# Patient Record
Sex: FEMALE | Race: WHITE | NOT HISPANIC OR LATINO | URBAN - METROPOLITAN AREA
[De-identification: names, ages, dates, MRNs, and addresses within clinical notes are randomized per-mention and may not be internally consistent; named-entity substitution may affect disease eponyms.]

---

## 2020-11-13 NOTE — PATIENT DISCUSSION
for one week, IOP was elevated now  controlled, no associated systemic complaints, but patient did have illness 2 months ago with diffuse body pain, moderate vitritis present with AC cell,  Will check labs today, if no evidence of infection will do STK injection and start PO steroids.   continue pred forte 1% Q2H, ilevro QDAY, Atropine BID.

## 2020-11-16 NOTE — PATIENT DISCUSSION
11/16/20: FIRST EPISODE. SEVERE. NO EVIDENCE OF GRANULOMATOUS INF ANTERIORLY, BUT SNOWBALLS ARE SIGNIFICANT IN ANT AND POST VITREOUS. GIVEN SECONDARY GLAUCOMA MAIN DDX: FUCHS (UNLIKELY 2/2 CHANGES POSTERIORLY, SEVERITY). MOST LIKELY SARCOID VS HERPETIC. THE LATTER IS MOST CONCERNING TO ME GIVEN PERIPHERAL WHITENING APPRECIATED ON EXAM TODAY. DISCUSSED R/B OF TX IF ARN. THEREFORE, WE'LL START PATIENT ON VALTREX 1 G TID AS INDUCTION. MAY START STEROIDS PO ON WEDNESDAY.

## 2020-11-18 NOTE — PATIENT DISCUSSION
IOP improved, decreased inflammation,   continue pred forte 1% Q2H, ilevro QDAY, Atropine BID, labs pending, ac biopsy pending. Continue acyclovir but ARN unlikely.

## 2020-11-25 NOTE — PATIENT DISCUSSION
11/25/20: STUDIES HAVE ONLY COME BACK + FOR LYME -SPECIFIC SUBSTUDIES- W/O RELATIVE IMPORTANCE. RPR, QF, HERPES & TOXO STUDIES NEGATIVE. AFTER R/O INFECTIOUS ETIOLOGIES, WILL START 1MG/KG PREDNISONE PO AND REASSESS RESPONSE. WILL CONT INVESTIGATING ETIOLOGY. WILL COMPLETE 10 D OF VALTREX TX TO MAKE SURE SHE IS COVERED SINCE CLINICALLY IT WAS VERY SUGGESTIVE OF HERPES.

## 2020-12-01 NOTE — PATIENT DISCUSSION
no improvement in posterior reaction, only studies that have came back positive are LYME, continue valtrex & PO prednisone due to concern for ARN; however, will add Bacterim DS, if no improvement, will plan for a diagnostic PPV.

## 2020-12-04 NOTE — PATIENT DISCUSSION
CULTURE 11 16 20 SHOWS NEGATIVE CMV TOXO HSV - UNABLE TO GET VARICELLA DUE TO MINIMAL AMOUNT OF SPECIMEN.

## 2020-12-11 NOTE — PATIENT DISCUSSION
Saw Dr. Canela Factor, most likely ARN, continue valtrex & PO prednisone& bactrim DS, if no improvement, will plan for a diagnostic PPV.

## 2020-12-18 NOTE — PATIENT DISCUSSION
improved, continue valtrex & Decrease prednisone to 40MG QDAY PO & bactrim DS, if no improvement, will plan for a diagnostic PPV.

## 2021-01-13 NOTE — PATIENT DISCUSSION
improved, most likely ARN, continue valtrex TID  & D/C bactrim DS, Taper prednisone to 20MG a day for 2W then stop, taper pred forte TID for 2W, BID for 2W, QDAY for 2W thens stop.

## 2021-02-03 NOTE — PATIENT DISCUSSION
Improved, off PO steroids,  continue Valtrex, Continue taper of Pred Forte  BID for 2W, QDAY for 2W then stop.

## 2021-02-24 NOTE — PATIENT DISCUSSION
Resolving,  off PO steroids,  continue Valtrex, Continue taper of Pred Forte  to QDAY for 2W then stop.

## 2022-01-01 NOTE — PATIENT DISCUSSION
Recommended observation. If your baby has any of the following, CALL YOUR PEDIATRICIAN OR RETURN TO THE HOSPITAL

## 2022-01-10 ENCOUNTER — COMPREHENSIVE EXAM (OUTPATIENT)
Dept: URBAN - METROPOLITAN AREA CLINIC 16 | Facility: CLINIC | Age: 57
End: 2022-01-10

## 2022-01-10 DIAGNOSIS — H52.12: ICD-10-CM

## 2022-01-10 DIAGNOSIS — H52.223: ICD-10-CM

## 2022-01-10 DIAGNOSIS — H52.11: ICD-10-CM

## 2022-01-10 DIAGNOSIS — Z01.00: ICD-10-CM

## 2022-01-10 DIAGNOSIS — H52.4: ICD-10-CM

## 2022-01-10 DIAGNOSIS — H04.123: ICD-10-CM

## 2022-01-10 PROCEDURE — 92310C CONTACT LENS 75

## 2022-01-10 PROCEDURE — 92015 DETERMINE REFRACTIVE STATE: CPT

## 2022-01-10 PROCEDURE — 92014 COMPRE OPH EXAM EST PT 1/>: CPT

## 2022-01-10 ASSESSMENT — TONOMETRY
OD_IOP_MMHG: 16
OS_IOP_MMHG: 15

## 2022-04-14 NOTE — PATIENT DISCUSSION
Patient made aware of 24/7 emergency services. Mohs Case Number (Optional): [id number] Mohs Case Number (Optional): [id number]

## 2022-05-04 NOTE — PROCEDURE NOTE: CLINICAL
PROCEDURE NOTE: Punctal Plugs, Quintess Dissolvable (61085M, [id number]) Bilateral Lower Lids. Diagnosis: Dry Eye Syndrome. Prior to treatment, the risks/benefits/alternatives were discussed. The patient wished to proceed with procedure. Temporary collagen plugs were inserted. Patient tolerated procedure well. There were no complications. Post procedure instructions given. Catrachito Ross

## 2022-06-15 NOTE — PATIENT DISCUSSION
Pt C/O Flashes and floaters in the left eye, Scleral depressed No RT or RD on Exam, continue to monitor.

## 2022-07-05 RX ORDER — LORAZEPAM 1 MG/1
TABLET ORAL
COMMUNITY
Start: 2022-03-08

## 2022-07-05 RX ORDER — MONTELUKAST SODIUM 10 MG/1
TABLET ORAL
COMMUNITY

## 2022-08-17 NOTE — PATIENT DISCUSSION
Advised to call immediately if eye pain or loss of vision. [facility]          Chart reviewed, no opportunity found: CHART REVIEWED, NO OPPORTUNITY FOUND                     Patients insurance company:  [employer] [facility] (Medicare Advantage and Commercial)

## 2022-11-23 NOTE — PROCEDURE NOTE: CLINICAL
PROCEDURE NOTE: Punctal Plugs, Silicone OASIS 0.7 / 0.7 #2 Bilateral Lower Lids. Diagnosis: Dry Eye Syndrome. Anesthesia: Proparacaine 0.5%. Prior to treatment, the risks/benefits/alternatives were discussed. The patient wished to proceed with procedure. 1 drop of Proparacaine 0.5% was instilled in eye. 4.4.8 mm permanent silicone plugs were inserted in * eyelids. Lot # * and exp *  Patient tolerated procedure well. There were no complications. Post procedure instructions given. Briseida Mercedes

## 2023-01-16 ENCOUNTER — COMPREHENSIVE EXAM (OUTPATIENT)
Dept: URBAN - METROPOLITAN AREA CLINIC 16 | Facility: CLINIC | Age: 58
End: 2023-01-16

## 2023-01-16 DIAGNOSIS — H52.11: ICD-10-CM

## 2023-01-16 DIAGNOSIS — H52.12: ICD-10-CM

## 2023-01-16 DIAGNOSIS — Z01.00: ICD-10-CM

## 2023-01-16 DIAGNOSIS — H04.123: ICD-10-CM

## 2023-01-16 DIAGNOSIS — H52.4: ICD-10-CM

## 2023-01-16 DIAGNOSIS — H52.223: ICD-10-CM

## 2023-01-16 PROCEDURE — 92014 COMPRE OPH EXAM EST PT 1/>: CPT

## 2023-01-16 PROCEDURE — 92310C CONTACT LENS 75

## 2023-01-16 PROCEDURE — 92015 DETERMINE REFRACTIVE STATE: CPT

## 2023-01-16 ASSESSMENT — TONOMETRY
OS_IOP_MMHG: 16
OD_IOP_MMHG: 16

## 2024-01-18 ENCOUNTER — COMPREHENSIVE EXAM (OUTPATIENT)
Dept: URBAN - METROPOLITAN AREA CLINIC 16 | Facility: CLINIC | Age: 59
End: 2024-01-18

## 2024-01-18 DIAGNOSIS — H52.223: ICD-10-CM

## 2024-01-18 DIAGNOSIS — H52.4: ICD-10-CM

## 2024-01-18 DIAGNOSIS — H04.123: ICD-10-CM

## 2024-01-18 DIAGNOSIS — H52.12: ICD-10-CM

## 2024-01-18 DIAGNOSIS — Z01.00: ICD-10-CM

## 2024-01-18 DIAGNOSIS — H52.11: ICD-10-CM

## 2024-01-18 PROCEDURE — 92014 COMPRE OPH EXAM EST PT 1/>: CPT

## 2024-01-18 PROCEDURE — 92015 DETERMINE REFRACTIVE STATE: CPT

## 2024-01-18 PROCEDURE — 92310C CONTACT LENS 75

## 2024-01-18 ASSESSMENT — TONOMETRY
OS_IOP_MMHG: 12
OD_IOP_MMHG: 12

## 2025-01-28 ENCOUNTER — COMPREHENSIVE EXAM (OUTPATIENT)
Age: 60
End: 2025-01-28

## 2025-01-28 DIAGNOSIS — H04.123: ICD-10-CM

## 2025-01-28 DIAGNOSIS — H52.12: ICD-10-CM

## 2025-01-28 DIAGNOSIS — H52.223: ICD-10-CM

## 2025-01-28 DIAGNOSIS — H52.11: ICD-10-CM

## 2025-01-28 DIAGNOSIS — H52.4: ICD-10-CM

## 2025-01-28 DIAGNOSIS — Z01.00: ICD-10-CM

## 2025-01-28 PROCEDURE — 92015 DETERMINE REFRACTIVE STATE: CPT

## 2025-01-28 PROCEDURE — 92310-1 LEVEL 1 SOFT LENS UPDATE

## 2025-01-28 PROCEDURE — 92014 COMPRE OPH EXAM EST PT 1/>: CPT
